# Patient Record
Sex: FEMALE | Race: WHITE | URBAN - NONMETROPOLITAN AREA
[De-identification: names, ages, dates, MRNs, and addresses within clinical notes are randomized per-mention and may not be internally consistent; named-entity substitution may affect disease eponyms.]

---

## 2018-08-22 ENCOUNTER — NURSE TRIAGE (OUTPATIENT)
Dept: ADMINISTRATIVE | Age: 23
End: 2018-08-22

## 2018-08-23 NOTE — TELEPHONE ENCOUNTER
Reason for Disposition   Blood in urine  (Exception: could be normal menstrual bleeding)    Answer Assessment - Initial Assessment Questions  1. COLOR of URINE: \"Describe the color of the urine. \"  (e.g., tea-colored, pink, red, blood clots, bloody)      Orange    2. ONSET: \"When did the bleeding start? \"      Yesterday   3. EPISODES: \"How many times has there been blood in the urine? \" or \"How many times today?\"      2 - 3 in the last two days. 4. PAIN with URINATION: \"Is there any pain with passing your urine? \" If so, ask: \"How bad is the pain? \"  (Scale 1-10; or mild, moderate, severe)     - MILD - complains slightly about urination hurting     - MODERATE - interferes with normal activities       - SEVERE - excruciating, unwilling or unable to urinate because of the pain       No.   5. FEVER: \"Do you have a fever? \" If so, ask: \"What is your temperature, how was it measured, and when did it start? \"      Unknown   6. ASSOCIATED SYMPTOMS: Hussain Frederick you passing urine more frequently than usual?\"      No.   7. OTHER SYMPTOMS: \"Do you have any other symptoms? \" (e.g., back/flank pain, abdominal pain, vomiting)      No   8. PREGNANCY: \"Is there any chance you are pregnant? \" \"When was your last menstrual period? \"      No.    Protocols used: URINE - BLOOD IN-ADULT-    Taking Rx. Acyclovir for shingles. I advice Lyssa Porter not to take her pill tonight and be seen in the urgent care in the morning. Caller has no PCP.